# Patient Record
Sex: MALE | Race: WHITE
[De-identification: names, ages, dates, MRNs, and addresses within clinical notes are randomized per-mention and may not be internally consistent; named-entity substitution may affect disease eponyms.]

---
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
## 2025-03-11 NOTE — PHYS DOC
Past Medical History


Past Medical History:  Asthma, Other


Additional Past Medical Histor:  Heart murmur


Past Surgical History:  No Surgical History


Smoking Status:  Never Smoker


Alcohol Use:  None


Drug Use:  Marijuana





General Pediatric Assessment


Chief Complaint


Chief Complaint:  MULTIPLE COMPLAINTS





History of Present Illness


History of Present Illness





Patient is a 15-year-old male patient who presents the ED today with mother, 

mother reports patient was  his 2 dogs that were in a fight.  In the 

process he became diaphoretic.  He was sweating, he was lightheaded, he became 

pale.  He was not able to see for couple seconds.  They called EMS who evaluated

patient then mother decided to bring patient to the ED.  Patient has no 

complaints.








Historian was the patient and mother





Review of Systems


Review of Systems





Constitutional: Denies fever or chills []


Eyes: Denies change in visual acuity, redness, or eye pain []


HENT: Denies nasal congestion or sore throat []


Respiratory: Denies cough or shortness of breath []


Cardiovascular: No additional information not addressed in HPI []


GI: Denies abdominal pain, nausea, vomiting, bloody stools or diarrhea []


:  Denies dysuria or hematuria []


Musculoskeletal: Denies back pain or joint pain []


Integument: Denies rash or skin lesions []


Neurologic: Reports lightheadedness, sweating, pale,and visual disturbance 

denies headache, focal weakness or sensory changes []





All other systems were reviewed and found to be within normal limits, except as 

documented in this note.





Current Medications


Current Medications





Current Medications








 Medications


  (Trade)  Dose


 Ordered  Sig/Becky  Start Time


 Stop Time Status Last Admin


Dose Admin


 


 Sodium Chloride  1,000 ml @ 


 1,000 mls/hr  1X  ONCE  20 23:30


 20 00:29  20 23:28


1,000 MLS/HR











Allergies


Allergies





Allergies








Coded Allergies Type Severity Reaction Last Updated Verified


 


  No Known Drug Allergies    20 No











Physical Exam


Physical Exam





Constitutional: Well developed, well nourished, no acute distress, non-toxic 

appearance, positive interaction, playful. []


HENT: Normocephalic, atraumatic, bilateral external ears normal, oropharynx 

moist, no oral exudates, nose normal. [] 


Eyes: PERRLA, conjunctiva normal, no discharge. []


Neck: Normal range of motion, no tenderness, supple, no stridor. []


Cardiovascular: Normal heart rate, normal rhythm, no murmurs, no rubs, no 

gallops. []


Thorax and Lungs: Normal breath sounds, no respiratory distress, no wheezing, no

 chest tenderness, no retractions, no accessory muscle use. []


Abdomen: Bowel sounds normal, soft, no tenderness, no masses []


Skin: Warm, dry, no erythema, no rash. []


Back: No tenderness, no CVA tenderness. []


Extremities: Intact distal pulses, no tenderness, no cyanosis, ROM intact, no 

edema, no deformities. [] 


Neurologic: Alert and interactive, normal motor function, normal sensory 

function, no focal deficits noted. []


Vital Signs





                                   Vital Signs








  Date Time  Temp Pulse Resp B/P (MAP) Pulse Ox O2 Delivery O2 Flow Rate FiO2


 


20 22:30 98.2 65 18 110/59 99   





 98.2       











Radiology/Procedures


Radiology/Procedures


2310 Interpreted by Dr. Lainez sinus rhythm HR 68 no STEMI[]





Labs


Current Patient Data





                                Laboratory Tests








Test


 20


22:30 20


22:50


 


Urine Collection Type Unknown   


 


Urine Color Yellow   


 


Urine Clarity Clear   


 


Urine pH


 6.0 (<5.0-8.0)


 





 


Urine Specific Gravity


 1.025


(1.000-1.030) 





 


Urine Protein


 100 mg/dL


(NEG-TRACE) 





 


Urine Glucose (UA)


 Negative mg/dL


(NEG) 





 


Urine Ketones (Stick)


 Negative mg/dL


(NEG) 





 


Urine Blood


 Negative (NEG)


 





 


Urine Nitrite


 Negative (NEG)


 





 


Urine Bilirubin


 Negative (NEG)


 





 


Urine Urobilinogen Dipstick


 1.0 mg/dL (0.2


mg/dL) 





 


Urine Leukocyte Esterase


 Negative (NEG)


 





 


Urine RBC 0 /HPF (0-2)   


 


Urine WBC


 Occ /HPF (0-4)


 





 


Urine Squamous Epithelial


Cells Few /LPF  


 





 


Urine Bacteria


 0 /HPF (0-FEW)


 





 


Urine Mucus Mod /LPF   


 


Urine Opiates Screen Neg (NEG)   


 


Urine Methadone Screen Neg (NEG)   


 


Urine Barbiturates Neg (NEG)   


 


Urine Phencyclidine Screen Neg (NEG)   


 


Urine


Amphetamine/Methamphetamine Neg (NEG)  


 





 


Urine Benzodiazepines Screen Neg (NEG)   


 


Urine Cocaine Screen Neg (NEG)   


 


Urine Cannabinoids Screen Pos (NEG)   


 


Urine Ethyl Alcohol Neg (NEG)   


 


White Blood Count


 


 8.0 x10^3/uL


(4.5-13.5)


 


Red Blood Count


 


 4.79 x10^6/uL


(3.80-5.30)


 


Hemoglobin


 


 15.3 g/dL


(12.5-15.0)  H


 


Hematocrit


 


 43.4 %


(37.0-45.0)


 


Mean Corpuscular Volume  91 fL (80-96)  


 


Mean Corpuscular Hemoglobin  32 pg (23-34)  


 


Mean Corpuscular Hemoglobin


Concent 


 35 g/dL


(31-37)


 


Red Cell Distribution Width


 


 13.6 %


(11.5-14.5)


 


Platelet Count


 


 160 x10^3/uL


(140-400)


 


Neutrophils (%) (Auto)  78 % (31-73)  H


 


Lymphocytes (%) (Auto)  12 % (24-48)  L


 


Monocytes (%) (Auto)  9 % (0-9)  


 


Eosinophils (%) (Auto)  1 % (0-3)  


 


Basophils (%) (Auto)  0 % (0-3)  


 


Neutrophils # (Auto)


 


 6.2 x10^3/uL


(1.8-7.7)


 


Lymphocytes # (Auto)


 


 0.9 x10^3/uL


(1.0-4.8)  L


 


Monocytes # (Auto)


 


 0.8 x10^3/uL


(0.0-1.1)


 


Eosinophils # (Auto)


 


 0.1 x10^3/uL


(0.0-0.7)


 


Basophils # (Auto)


 


 0.0 x10^3/uL


(0.0-0.2)


 


Sodium Level


 


 143 mmol/L


(136-145)


 


Potassium Level


 


 4.1 mmol/L


(3.5-5.1)


 


Chloride Level


 


 105 mmol/L


()


 


Carbon Dioxide Level


 


 28 mmol/L


(22-29)


 


Anion Gap  10 (6-14)  


 


Blood Urea Nitrogen


 


 12 mg/dL


(8-26)


 


Creatinine


 


 1.1 mg/dL


(0.7-1.3)


 


Estimated GFR


(Cockcroft-Gault) 


   





 


BUN/Creatinine Ratio  11 (6-20)  


 


Glucose Level


 


 85 mg/dL


(60-99)


 


Calcium Level


 


 9.1 mg/dL


(8.5-10.1)


 


Magnesium Level


 


 2.2 mg/dL


(1.8-2.4)


 


Total Bilirubin


 


 0.3 mg/dL


(0.2-1.0)


 


Aspartate Amino Transferase


(AST) 


 27 U/L (15-37)





 


Alanine Aminotransferase (ALT)


 


 18 U/L (16-63)





 


Alkaline Phosphatase


 


 159 U/L


()


 


Total Protein


 


 7.3 g/dL


(6.4-8.2)


 


Albumin


 


 4.2 g/dL


(3.4-5.0)


 


Albumin/Globulin Ratio  1.4 (1.0-1.7)  


 


Salicylates Level


 


 < 2.8 mg/dL


(2.8-20.0)  L


 


Salicylate Last Dose Date  Unk  


 


Salicylate Last Dose Time  Unk  


 


Acetaminophen Level


 


 < 2 mcg/ml


(10-30)  L


 


Acetaminophen Last Dose Date  Unk  


 


Acetaminophen Last Dose Time  Unk  


 


Ethyl Alcohol Level


 


 < 10 mg/dL


(0-10)





                                Laboratory Tests


20 22:50








                                Laboratory Tests


20 22:50











Course & Med Decision Making


Course & Med Decision Making


Pertinent Labs and Imaging studies reviewed. (See chart for details)





This is a 15-year-old male patient presenting to the ED today with multiple 

complaints.  Patient was apparently trying to separate his 2 dogs that were 

fighting in the house, he became dizzy diaphoretic pale and had some vision 

disturbance.  He arrives in the ED with no complaints.  Appears very well.





EKG is negative, labs are negative.  Noted for marijuana use which was reported 

to mother and patient.  Mother reports patient's best friend just  a couple 

days ago due to an overdose of fentanyl and oxycodone.  Encourage mother to get 

help for the son.  Discharge to home.  Follow-up with PCP.





Laboratory


Lab Results





Laboratory Tests








Test


 20


22:30 12


22:50


 


Urine Collection Type Unknown  


 


Urine Color Yellow  


 


Urine Clarity Clear  


 


Urine pH 6.0 (<5.0-8.0)  


 


Urine Specific Gravity


 1.025


(1.000-1.030) 





 


Urine Protein


 100 mg/dL


(NEG-TRACE) 





 


Urine Glucose (UA)


 Negative mg/dL


(NEG) 





 


Urine Ketones (Stick)


 Negative mg/dL


(NEG) 





 


Urine Blood Negative (NEG)  


 


Urine Nitrite Negative (NEG)  


 


Urine Bilirubin Negative (NEG)  


 


Urine Urobilinogen Dipstick


 1.0 mg/dL (0.2


mg/dL) 





 


Urine Leukocyte Esterase Negative (NEG)  


 


Urine RBC 0 /HPF (0-2)  


 


Urine WBC Occ /HPF (0-4)  


 


Urine Squamous Epithelial


Cells Few /LPF 


 





 


Urine Bacteria 0 /HPF (0-FEW)  


 


Urine Mucus Mod /LPF  


 


Urine Opiates Screen Neg (NEG)  


 


Urine Methadone Screen Neg (NEG)  


 


Urine Barbiturates Neg (NEG)  


 


Urine Phencyclidine Screen Neg (NEG)  


 


Urine


Amphetamine/Methamphetamine Neg (NEG) 


 





 


Urine Benzodiazepines Screen Neg (NEG)  


 


Urine Cocaine Screen Neg (NEG)  


 


Urine Cannabinoids Screen Pos (NEG)  


 


Urine Ethyl Alcohol Neg (NEG)  


 


White Blood Count


 


 8.0 x10^3/uL


(4.5-13.5)


 


Red Blood Count


 


 4.79 x10^6/uL


(3.80-5.30)


 


Hemoglobin


 


 15.3 g/dL


(12.5-15.0)


 


Hematocrit


 


 43.4 %


(37.0-45.0)


 


Mean Corpuscular Volume  91 fL (80-96) 


 


Mean Corpuscular Hemoglobin  32 pg (23-34) 


 


Mean Corpuscular Hemoglobin


Concent 


 35 g/dL


(31-37)


 


Red Cell Distribution Width


 


 13.6 %


(11.5-14.5)


 


Platelet Count


 


 160 x10^3/uL


(140-400)


 


Neutrophils (%) (Auto)  78 % (31-73) 


 


Lymphocytes (%) (Auto)  12 % (24-48) 


 


Monocytes (%) (Auto)  9 % (0-9) 


 


Eosinophils (%) (Auto)  1 % (0-3) 


 


Basophils (%) (Auto)  0 % (0-3) 


 


Neutrophils # (Auto)


 


 6.2 x10^3/uL


(1.8-7.7)


 


Lymphocytes # (Auto)


 


 0.9 x10^3/uL


(1.0-4.8)


 


Monocytes # (Auto)


 


 0.8 x10^3/uL


(0.0-1.1)


 


Eosinophils # (Auto)


 


 0.1 x10^3/uL


(0.0-0.7)


 


Basophils # (Auto)


 


 0.0 x10^3/uL


(0.0-0.2)


 


Sodium Level


 


 143 mmol/L


(136-145)


 


Potassium Level


 


 4.1 mmol/L


(3.5-5.1)


 


Chloride Level


 


 105 mmol/L


()


 


Carbon Dioxide Level


 


 28 mmol/L


(22-29)


 


Anion Gap  10 (6-14) 


 


Blood Urea Nitrogen


 


 12 mg/dL


(8-26)


 


Creatinine


 


 1.1 mg/dL


(0.7-1.3)


 


Estimated GFR


(Cockcroft-Gault) 


  





 


BUN/Creatinine Ratio  11 (6-20) 


 


Glucose Level


 


 85 mg/dL


(60-99)


 


Calcium Level


 


 9.1 mg/dL


(8.5-10.1)


 


Magnesium Level


 


 2.2 mg/dL


(1.8-2.4)


 


Total Bilirubin


 


 0.3 mg/dL


(0.2-1.0)


 


Aspartate Amino Transf


(AST/SGOT) 


 27 U/L (15-37) 





 


Alanine Aminotransferase


(ALT/SGPT) 


 18 U/L (16-63) 





 


Alkaline Phosphatase


 


 159 U/L


()


 


Total Protein


 


 7.3 g/dL


(6.4-8.2)


 


Albumin


 


 4.2 g/dL


(3.4-5.0)


 


Albumin/Globulin Ratio  1.4 (1.0-1.7) 


 


Salicylates Level


 


 < 2.8 mg/dL


(2.8-20.0)


 


Salicylate Last Dose Date  Unk 


 


Salicylate Last Dose Time  Unk 


 


Acetaminophen Level


 


 < 2 mcg/ml


(10-30)


 


Acetaminophen Last Dose Date  Unk 


 


Acetaminophen Last Dose Time  Unk 


 


Ethyl Alcohol Level


 


 < 10 mg/dL


(0-10)








Laboratory Tests








Test


 20


22:30 20


22:50


 


Urine Collection Type Unknown  


 


Urine Color Yellow  


 


Urine Clarity Clear  


 


Urine pH 6.0 (<5.0-8.0)  


 


Urine Specific Gravity


 1.025


(1.000-1.030) 





 


Urine Protein


 100 mg/dL


(NEG-TRACE) 





 


Urine Glucose (UA)


 Negative mg/dL


(NEG) 





 


Urine Ketones (Stick)


 Negative mg/dL


(NEG) 





 


Urine Blood Negative (NEG)  


 


Urine Nitrite Negative (NEG)  


 


Urine Bilirubin Negative (NEG)  


 


Urine Urobilinogen Dipstick


 1.0 mg/dL (0.2


mg/dL) 





 


Urine Leukocyte Esterase Negative (NEG)  


 


Urine RBC 0 /HPF (0-2)  


 


Urine WBC Occ /HPF (0-4)  


 


Urine Squamous Epithelial


Cells Few /LPF 


 





 


Urine Bacteria 0 /HPF (0-FEW)  


 


Urine Mucus Mod /LPF  


 


Urine Opiates Screen Neg (NEG)  


 


Urine Methadone Screen Neg (NEG)  


 


Urine Barbiturates Neg (NEG)  


 


Urine Phencyclidine Screen Neg (NEG)  


 


Urine


Amphetamine/Methamphetamine Neg (NEG) 


 





 


Urine Benzodiazepines Screen Neg (NEG)  


 


Urine Cocaine Screen Neg (NEG)  


 


Urine Cannabinoids Screen Pos (NEG)  


 


Urine Ethyl Alcohol Neg (NEG)  


 


White Blood Count


 


 8.0 x10^3/uL


(4.5-13.5)


 


Red Blood Count


 


 4.79 x10^6/uL


(3.80-5.30)


 


Hemoglobin


 


 15.3 g/dL


(12.5-15.0)


 


Hematocrit


 


 43.4 %


(37.0-45.0)


 


Mean Corpuscular Volume  91 fL (80-96) 


 


Mean Corpuscular Hemoglobin  32 pg (23-34) 


 


Mean Corpuscular Hemoglobin


Concent 


 35 g/dL


(31-37)


 


Red Cell Distribution Width


 


 13.6 %


(11.5-14.5)


 


Platelet Count


 


 160 x10^3/uL


(140-400)


 


Neutrophils (%) (Auto)  78 % (31-73) 


 


Lymphocytes (%) (Auto)  12 % (24-48) 


 


Monocytes (%) (Auto)  9 % (0-9) 


 


Eosinophils (%) (Auto)  1 % (0-3) 


 


Basophils (%) (Auto)  0 % (0-3) 


 


Neutrophils # (Auto)


 


 6.2 x10^3/uL


(1.8-7.7)


 


Lymphocytes # (Auto)


 


 0.9 x10^3/uL


(1.0-4.8)


 


Monocytes # (Auto)


 


 0.8 x10^3/uL


(0.0-1.1)


 


Eosinophils # (Auto)


 


 0.1 x10^3/uL


(0.0-0.7)


 


Basophils # (Auto)


 


 0.0 x10^3/uL


(0.0-0.2)


 


Sodium Level


 


 143 mmol/L


(136-145)


 


Potassium Level


 


 4.1 mmol/L


(3.5-5.1)


 


Chloride Level


 


 105 mmol/L


()


 


Carbon Dioxide Level


 


 28 mmol/L


(22-29)


 


Anion Gap  10 (6-14) 


 


Blood Urea Nitrogen


 


 12 mg/dL


(8-26)


 


Creatinine


 


 1.1 mg/dL


(0.7-1.3)


 


Estimated GFR


(Cockcroft-Gault) 


  





 


BUN/Creatinine Ratio  11 (6-20) 


 


Glucose Level


 


 85 mg/dL


(60-99)


 


Calcium Level


 


 9.1 mg/dL


(8.5-10.1)


 


Magnesium Level


 


 2.2 mg/dL


(1.8-2.4)


 


Total Bilirubin


 


 0.3 mg/dL


(0.2-1.0)


 


Aspartate Amino Transf


(AST/SGOT) 


 27 U/L (15-37) 





 


Alanine Aminotransferase


(ALT/SGPT) 


 18 U/L (16-63) 





 


Alkaline Phosphatase


 


 159 U/L


()


 


Total Protein


 


 7.3 g/dL


(6.4-8.2)


 


Albumin


 


 4.2 g/dL


(3.4-5.0)


 


Albumin/Globulin Ratio  1.4 (1.0-1.7) 


 


Salicylates Level


 


 < 2.8 mg/dL


(2.8-20.0)


 


Salicylate Last Dose Date  Unk 


 


Salicylate Last Dose Time  Unk 


 


Acetaminophen Level


 


 < 2 mcg/ml


(10-30)


 


Acetaminophen Last Dose Date  Unk 


 


Acetaminophen Last Dose Time  Unk 


 


Ethyl Alcohol Level


 


 < 10 mg/dL


(0-10)











Dragon Disclaimer


Dragon Disclaimer


This electronic medical record was generated, in whole or in part, using a voice

 recognition dictation system.





Departure


Departure


Impression:  


   Primary Impression:  


   Vasovagal attack


   Additional Impression:  


   Marijuana use


Disposition:  01 DC HOME SELF CARE/HOMELESS


Condition:  STABLE


Referrals:  


ASAD HEWITT (PCP)


Follow-up next week


Patient Instructions:  Dizziness, Easy-to-Read, Marijuana Abuse-Brief





Additional Instructions:  


[place] was evaluated in the emergency room, his lab work was negative for any 

acute findings.  He was noted for using marijuana.  Consider getting him help 

for drug use.  Follow-up with your own pediatrician in 1 week.





Problem Qualifiers











JAMEY MELGAR              Dec 21, 2020 23:51 Statement Selected